# Patient Record
Sex: MALE | Race: WHITE | NOT HISPANIC OR LATINO | URBAN - METROPOLITAN AREA
[De-identification: names, ages, dates, MRNs, and addresses within clinical notes are randomized per-mention and may not be internally consistent; named-entity substitution may affect disease eponyms.]

---

## 2021-04-13 ENCOUNTER — EMERGENCY (EMERGENCY)
Facility: HOSPITAL | Age: 26
LOS: 0 days | Discharge: HOME | End: 2021-04-13
Attending: EMERGENCY MEDICINE | Admitting: EMERGENCY MEDICINE
Payer: COMMERCIAL

## 2021-04-13 VITALS
DIASTOLIC BLOOD PRESSURE: 59 MMHG | WEIGHT: 175.93 LBS | TEMPERATURE: 98 F | SYSTOLIC BLOOD PRESSURE: 128 MMHG | RESPIRATION RATE: 17 BRPM | HEART RATE: 57 BPM | OXYGEN SATURATION: 100 %

## 2021-04-13 VITALS
HEART RATE: 55 BPM | DIASTOLIC BLOOD PRESSURE: 85 MMHG | TEMPERATURE: 97 F | SYSTOLIC BLOOD PRESSURE: 145 MMHG | RESPIRATION RATE: 17 BRPM | OXYGEN SATURATION: 100 %

## 2021-04-13 DIAGNOSIS — R55 SYNCOPE AND COLLAPSE: ICD-10-CM
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
## 2021-04-13 DIAGNOSIS — R20.0 ANESTHESIA OF SKIN: ICD-10-CM

## 2021-04-13 DIAGNOSIS — T50.B95A ADVERSE EFFECT OF OTHER VIRAL VACCINES, INITIAL ENCOUNTER: ICD-10-CM

## 2021-04-13 DIAGNOSIS — T80.62XA OTHER SERUM REACTION DUE TO VACCINATION, INITIAL ENCOUNTER: ICD-10-CM
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
## 2021-04-13 PROCEDURE — 99284 EMERGENCY DEPT VISIT MOD MDM: CPT

## 2021-04-13 NOTE — ED PROVIDER NOTE - PATIENT PORTAL LINK FT
You can access the FollowMyHealth Patient Portal offered by [facility] by registering at the following website: [email or web address]. By joining [employer]’s FollowMyHealth portal, you will also be able to view your health information using other applications (apps) compatible with our system.

## 2021-04-13 NOTE — ED PROVIDER NOTE - CARE PROVIDER_API CALL
Zane Otero  CARDIOVASCULAR DISEASE  [address] [place] [id number]  Phone: [phone]  Fax: [phone]  Follow Up Time:

## 2021-04-13 NOTE — ED PROVIDER NOTE - CLINICAL SUMMARY MEDICAL DECISION MAKING FREE TEXT BOX
Pt here with syncope s/p covid vaccine.  pt reports having panic attack and hyperventilating after getting the shot.  no other complaints.  no cp, no sob.  no evidence of allergic reaction, no rash, no swelling.  pt nontoxic, well appearing.  Bedside echo good EF and no effusion.  ekg unremarkable.  finger stick normal.  nonfocal neuro exam.  pt dc with outpatient follow up. Pt here with syncope s/p covid vaccine.  pt reports having panic attack and hyperventilating after getting the shot.  no other complaints.  no cp, no sob.  no evidence of allergic reaction, no rash, no swelling.  H and P consistent with vasovagal syncopal episode.    pt nontoxic, well appearing in the ED.  Bedside echo good EF and no effusion.  ekg unremarkable.  finger stick normal.  nonfocal neuro exam.  pt dc with outpatient follow up.

## 2021-04-13 NOTE — ED PROVIDER NOTE - PROGRESS NOTE DETAILS
bedside point of care echo preformed . patient with syncope. grossly normal function, no effusion, good ef, no right sided heart strain patient feeling much improved. will dc home

## 2021-04-13 NOTE — ED PROVIDER NOTE - ATTENDING CONTRIBUTION TO CARE
Pt here with syncope after getting vaccine.  pt got his covid vaccine and reports that he had a panic attack after getting the shot.  pt reports he was hyperventilating, had a panic attack, and then passed out.  no cp, no sob, no rash, no swelling.  no allergic reaction.  no abd pain, no chills.    awake, alert.  neck supple.  eomi.  motor/sensation intact in all 4 ext.    Bedside echo done revealing good EF, no effusion.  p: ekg, finger stick, dc

## 2021-04-13 NOTE — ED PROVIDER NOTE - OBJECTIVE STATEMENT
26 y/o male presents to the Ed s/p syncopal episode at [facility] after receiving first covid vaccine [employer] 15 minutes later. pt c/o tingling to right hand. no cp, palpitations, sob. no headache. no difficulty swallowing, rashes. patient c/o weakness. patient ate this am

## 2021-04-13 NOTE — ED PROVIDER NOTE - PHYSICAL EXAMINATION
Vital Signs: I have reviewed the initial vital signs.  Constitutional: well-nourished, no acute distress, normocephalic  Eyes: PERRLA, EOMI, no nystagmus, clear conjunctiva  ENT: MMM, uvula midline, no swelling oropharynx. no stridor  Cardiovascular: regular rate, regular rhythm, no murmur appreciated  Respiratory: unlabored respiratory effort, clear to auscultation bilaterally  Gastrointestinal: soft, non-tender, non-distended  abdomen, no pulsatile mass  Musculoskeletal: supple neck, no lower extremity edema, no bony tenderness  Integumentary: warm, dry, no rash  Neurologic: awake, alert, cranial nerves II-XII grossly intact, extremities’ motor and sensory functions grossly intact, no focal deficits, GCS 15

## 2022-07-19 PROBLEM — Z78.9 OTHER SPECIFIED HEALTH STATUS: Chronic | Status: ACTIVE | Noted: 2021-04-13

## 2022-08-12 ENCOUNTER — APPOINTMENT (OUTPATIENT)
Dept: NEUROLOGY | Facility: CLINIC | Age: 27
End: 2022-08-12

## 2022-12-05 ENCOUNTER — APPOINTMENT (OUTPATIENT)
Dept: CARDIOLOGY | Facility: CLINIC | Age: 27
End: 2022-12-05

## 2022-12-05 VITALS
DIASTOLIC BLOOD PRESSURE: 94 MMHG | SYSTOLIC BLOOD PRESSURE: 138 MMHG | WEIGHT: 213 LBS | TEMPERATURE: 97.8 F | HEIGHT: 70 IN | RESPIRATION RATE: 16 BRPM | BODY MASS INDEX: 30.49 KG/M2 | HEART RATE: 53 BPM

## 2022-12-05 DIAGNOSIS — Z82.3 FAMILY HISTORY OF STROKE: ICD-10-CM

## 2022-12-05 DIAGNOSIS — Z78.9 OTHER SPECIFIED HEALTH STATUS: ICD-10-CM

## 2022-12-05 DIAGNOSIS — Z87.891 PERSONAL HISTORY OF NICOTINE DEPENDENCE: ICD-10-CM

## 2022-12-05 DIAGNOSIS — Z00.00 ENCOUNTER FOR GENERAL ADULT MEDICAL EXAMINATION W/OUT ABNORMAL FINDINGS: ICD-10-CM

## 2022-12-05 PROCEDURE — 93000 ELECTROCARDIOGRAM COMPLETE: CPT

## 2022-12-05 PROCEDURE — 99204 OFFICE O/P NEW MOD 45 MIN: CPT | Mod: 25

## 2022-12-05 NOTE — ASSESSMENT
[FreeTextEntry1] : 26 YOM with no signiificant PMHx.\par Episodes of elevated BP for a year.\par Sinus bradycardia.\par \par Plan:\par 2D ECHO.\par Fasting blood work and UA ordered\par Low sodium diet discussed.\par Follow up after the tests.\par \par Antonio Sheffield MD\par

## 2022-12-05 NOTE — HISTORY OF PRESENT ILLNESS
[FreeTextEntry1] : 26 year old male with no significant PMHx presents for a cardiac evaluation\par \par He is c/o high BP for about a year, 130s-140/80s. He also reports occasional shortness of breath when he lies down. \par \par Exercises in the gym 3-4 times a week with no anginal symptoms, but some dizziness. Denies using any exercise supplements or steroids.

## 2022-12-06 ENCOUNTER — RESULT CHARGE (OUTPATIENT)
Age: 27
End: 2022-12-06

## 2022-12-20 ENCOUNTER — APPOINTMENT (OUTPATIENT)
Dept: CARDIOLOGY | Facility: CLINIC | Age: 27
End: 2022-12-20
Payer: COMMERCIAL

## 2022-12-20 PROCEDURE — 93306 TTE W/DOPPLER COMPLETE: CPT

## 2023-01-17 ENCOUNTER — APPOINTMENT (OUTPATIENT)
Dept: CARDIOLOGY | Facility: CLINIC | Age: 28
End: 2023-01-17
Payer: COMMERCIAL

## 2023-01-17 ENCOUNTER — RESULT CHARGE (OUTPATIENT)
Age: 28
End: 2023-01-17

## 2023-01-17 VITALS
WEIGHT: 220 LBS | HEIGHT: 70 IN | HEART RATE: 55 BPM | BODY MASS INDEX: 31.5 KG/M2 | SYSTOLIC BLOOD PRESSURE: 122 MMHG | DIASTOLIC BLOOD PRESSURE: 70 MMHG

## 2023-01-17 DIAGNOSIS — I10 ESSENTIAL (PRIMARY) HYPERTENSION: ICD-10-CM

## 2023-01-17 DIAGNOSIS — R00.1 BRADYCARDIA, UNSPECIFIED: ICD-10-CM

## 2023-01-17 PROCEDURE — 93000 ELECTROCARDIOGRAM COMPLETE: CPT

## 2023-01-17 PROCEDURE — 99213 OFFICE O/P EST LOW 20 MIN: CPT | Mod: 25

## 2023-01-17 NOTE — CARDIOLOGY SUMMARY
[de-identified] : 1/17/23\par SB 55bpm, no ST changes. [de-identified] : 12/20/22:\par LVEF 60%, normal diastolic function\par Mild MR, TR.

## 2023-01-17 NOTE — ASSESSMENT
[FreeTextEntry1] : 26 YOM with no significant PMHx.\par Sinus bradycardia.\par Normal BP repeatedly.\par Borderline hyperlipidemia.\par \par Plan:\par Low-fat, low-salt diet discussed with patient.\par He will continue to monitor his BP at home.\par Would repeat blood work in 1 year. \par Follow up as needed.\par \par Antonio Sheffield MD\par

## 2023-01-17 NOTE — HISTORY OF PRESENT ILLNESS
[FreeTextEntry1] : 26 year old male with no significant PMHx presents for a cardiac evaluation\par \par He presents for a follow up visit. He denies chest pain, LAMB, palpitations. \par \par Exercises in the gym 3-4 times a week with no anginal symptoms. Denies using any exercise supplements or steroids. \par \par GFR 78\par , triglycerides 85.\par TSH 2.95.

## 2023-01-17 NOTE — REASON FOR VISIT
[Initial] : an initial consultation for [Symptom and Test Evaluation] : symptom and test evaluation [Hypertension] : hypertension

## 2023-06-26 NOTE — ED ADULT NURSE NOTE - IN THE PAST 12 MONTHS HAVE YOU USED DRUGS OTHER THAN THOSE REQUIRED FOR MEDICAL REASON?
Faxing encounter to [facility] as this is a [facility] patient and not a clinic patient.   No

## 2025-01-27 NOTE — ED PROCEDURE NOTE - US DIAGNOSIS
previous_biopsy_has_been_previously_biopsied Body Location Override (Optional): left medial forehead Grossly Normal Function